# Patient Record
Sex: MALE | Race: BLACK OR AFRICAN AMERICAN | URBAN - METROPOLITAN AREA
[De-identification: names, ages, dates, MRNs, and addresses within clinical notes are randomized per-mention and may not be internally consistent; named-entity substitution may affect disease eponyms.]

---

## 2017-04-04 ENCOUNTER — OP HISTORICAL/CONVERTED ENCOUNTER (OUTPATIENT)
Dept: OTHER | Age: 37
End: 2017-04-04

## 2024-02-27 ENCOUNTER — OFFICE VISIT (OUTPATIENT)
Age: 44
End: 2024-02-27
Payer: MEDICAID

## 2024-02-27 DIAGNOSIS — M62.522 ATROPHY OF MUSCLE OF LEFT UPPER ARM: ICD-10-CM

## 2024-02-27 DIAGNOSIS — M25.512 ACUTE PAIN OF LEFT SHOULDER: Primary | ICD-10-CM

## 2024-02-27 PROCEDURE — 73030 X-RAY EXAM OF SHOULDER: CPT | Performed by: PHYSICIAN ASSISTANT

## 2024-02-27 PROCEDURE — 99204 OFFICE O/P NEW MOD 45 MIN: CPT | Performed by: PHYSICIAN ASSISTANT

## 2024-02-27 NOTE — PROGRESS NOTES
Mina Marie  1980   Chief Complaint   Patient presents with    Shoulder Pain     Left shoulder        HISTORY OF PRESENT ILLNESS  Mina Marie is a 43 y.o. male who presents today for evaluation of left arm pain and weakness.  He presents today with a caregiver from the group home he is in.  She states has been with them for the last few years and they have noticed a steady decline in him using his left arm.  He does not complain of any pain.  He is nonverbal.  They do not know of any injury.  He has been in formal therapy for the last 2 months and the physical therapist is concerned about a rotator cuff tear.  They do note also that his muscles are significantly weaker or smaller in the left arm compared to the right.. Pain is a n/a/10.   Has tried following treatments: Injections:No; Brace:No; Therapy:Yes; Cane/Crutch:No      No Known Allergies     History reviewed. No pertinent past medical history.   Social History       Tobacco History       Smoking Status  Never Assessed      Smokeless Tobacco Use  Unknown              Alcohol History       Alcohol Use Status  Not Asked              Drug Use       Drug Use Status  Not Asked              Sexual Activity       Sexually Active  Not Asked                   History reviewed. No pertinent surgical history.   History reviewed. No pertinent family history.  No current outpatient medications on file.     No current facility-administered medications for this visit.       REVIEW OF SYSTEM   Patient denies: Weight loss, Fever/Chills, HA, Visual changes, Fatigue, Chest pain, SOB, Abdominal pain, N/V/D/C, Blood in stool or urine, Edema.   Pertinent positive as above in HPI. All others were negative    PHYSICAL EXAM:   There were no vitals taken for this visit.  The patient is a well-developed, well-nourished male   in no acute distress.  The patient is alert and oriented times three.  The patient is alert and oriented times three. Mood and affect